# Patient Record
(demographics unavailable — no encounter records)

---

## 2025-07-10 NOTE — HISTORY OF PRESENT ILLNESS
[FreeTextEntry1] : 69 yo P2, menopausal, here today for gyn visit. Pt doing well. No gyn complaints.  [Post-Menopause, No Sxs] : post-menopausal, currently without symptoms [No] : Patient does not have concerns regarding sex

## 2025-07-10 NOTE — PHYSICAL EXAM
[Appropriately responsive] : appropriately responsive [Alert] : alert [Soft] : soft [Non-tender] : non-tender [Non-distended] : non-distended [Oriented x3] : oriented x3 [Examination Of The Breasts] : a normal appearance [No Masses] : no breast masses were palpable [Labia Majora] : normal [Labia Minora] : normal [Normal] : normal [Uterine Adnexae] : normal [No Tenderness] : no tenderness [Nl Sphincter Tone] : normal sphincter tone

## 2025-07-10 NOTE — PLAN
[FreeTextEntry1] : Routine gyn pap/hpv up to date mammo ref primary care with pmd pt to rv in 1 year or prn